# Patient Record
Sex: FEMALE
[De-identification: names, ages, dates, MRNs, and addresses within clinical notes are randomized per-mention and may not be internally consistent; named-entity substitution may affect disease eponyms.]

---

## 2023-06-08 PROBLEM — Z00.00 ENCOUNTER FOR PREVENTIVE HEALTH EXAMINATION: Status: ACTIVE | Noted: 2023-06-08

## 2023-06-13 ENCOUNTER — APPOINTMENT (OUTPATIENT)
Dept: SURGERY | Facility: CLINIC | Age: 38
End: 2023-06-13
Payer: COMMERCIAL

## 2023-06-13 VITALS
HEART RATE: 71 BPM | BODY MASS INDEX: 22.06 KG/M2 | RESPIRATION RATE: 18 BRPM | SYSTOLIC BLOOD PRESSURE: 110 MMHG | OXYGEN SATURATION: 97 % | WEIGHT: 112.38 LBS | DIASTOLIC BLOOD PRESSURE: 72 MMHG | HEIGHT: 60 IN

## 2023-06-13 DIAGNOSIS — Z80.3 FAMILY HISTORY OF MALIGNANT NEOPLASM OF BREAST: ICD-10-CM

## 2023-06-13 DIAGNOSIS — K80.20 CALCULUS OF GALLBLADDER W/OUT CHOLECYSTITIS W/OUT OBSTRUCTION: ICD-10-CM

## 2023-06-13 DIAGNOSIS — Z92.89 PERSONAL HISTORY OF OTHER MEDICAL TREATMENT: ICD-10-CM

## 2023-06-13 DIAGNOSIS — Z82.49 FAMILY HISTORY OF ISCHEMIC HEART DISEASE AND OTHER DISEASES OF THE CIRCULATORY SYSTEM: ICD-10-CM

## 2023-06-13 DIAGNOSIS — Z87.59 PERSONAL HISTORY OF OTHER COMPLICATIONS OF PREGNANCY, CHILDBIRTH AND THE PUERPERIUM: ICD-10-CM

## 2023-06-13 PROCEDURE — 99204 OFFICE O/P NEW MOD 45 MIN: CPT

## 2023-06-13 NOTE — DATA REVIEWED
[FreeTextEntry1] : US abdomen (5/24/2023) - 7 mm gallbladder calculus, and no sonographic evidence of acute cholecystitis.

## 2023-06-13 NOTE — ASSESSMENT
[FreeTextEntry1] : Ms. Arellano is a 37-year-old woman with cholelithiasis and one blood testing result of an AST of 39 (normal up to 32) in 2021, 5 months after giving birth.  As she is minimally symptomatic currently, she would prefer to avoid surgery if possible.  We also discussed possibly performing a cholecystectomy prior to any subsequent pregnancies to avoid gallbladder complications during pregnancy.  She will follow up with me as needed, if she develops ongoing symptoms.  If she wishes to proceed, we will plan for a robotic-assisted cholecystectomy at the patient's convenience.

## 2023-06-13 NOTE — HISTORY OF PRESENT ILLNESS
[de-identified] : Ms. Arellaon presented today for evaluation and management of cholelithiasis that was identified on an ultrasound that was performed due to elevated LFTs in 2021.  Since the ultrasound, she had one episode of pain approximately 3 weeks ago, when she was eating fatty food and she developed dull right upper quadrant pain.  The pain radiated into the back.  She then developed right upper quadrant pain again that same evening at approximately 12 AM, but it was less severe the second time.  She denied associated fever, chills, nausea, vomiting, diarrhea, or constipation.

## 2023-06-13 NOTE — CONSULT LETTER
[FreeTextEntry1] : 2023\par \par \par \par \par On JAN Watson\par [address] [facility]\[place] [address]\par Telephone #: [phone]\par \par \par Re: Amadou Arellano\par : 1985\par \par \par Dear Dr. Watson:\par \par I had the opportunity to see Ms. Arellano today for evaluation and management of cholelithiasis that was identified on an ultrasound that was performed due to elevated LFTs in .  Since the ultrasound, she had one episode of pain approximately 3 weeks ago, when she was eating fatty food and she developed dull right upper quadrant pain.  The pain radiated into the back.  She then developed right upper quadrant pain again that same evening at approximately 12 AM, but it was less severe the second time.  She denied associated fever, chills, nausea, vomiting, diarrhea, or constipation.\par \par On physical examination, her height is 5 feet, her weight is 112 pounds, and her BMI is 21.95.  Her blood pressure is 110/72, her heart rate is 71, and her O2 saturation is 97% on room air.  In general, she is a well-dressed, well-nourished woman who appears her stated age and is in no acute distress.  She is calm, alert and oriented x 3.  HEENT exam demonstrates no scleral icterus and a normocephalic atraumatic appearance. Her abdomen is soft, non-tender, and non-distended.  There is a well-healed Pfannenstiel incision from her prior  section without evidence of any incisional hernias.  Her extremities are warm and dry without clubbing, cyanosis or edema. \par \par I reviewed the report of the ultrasound of the abdomen that was performed on May 24, 2023, which demonstrated a 7 mm gallbladder calculus, and no sonographic evidence of acute cholecystitis.\par \par In summary, Ms. Arellano is a 37-year-old woman with cholelithiasis and one blood testing result of an AST of 39 (normal up to 32) in , 5 months after giving birth.  As she is minimally symptomatic currently, she would prefer to avoid surgery if possible.  We also discussed possibly performing a cholecystectomy prior to any subsequent pregnancies to avoid gallbladder complications during pregnancy.  She will follow up with me as needed, if she develops ongoing symptoms.  If she wishes to proceed, we will plan for a robotic-assisted cholecystectomy at the patient's convenience.\par \par Thank you for the opportunity to care for this patient. Please do not hesitate to contact me in the event that you have any questions or concerns about the care of this patient.\par \par Sincerely,\par \par \par \par Shelly Garcia M.D.\[facility] \[facility]:\par Cordell Her M.D.\[facility]\[address], [address]\[place] [address]\[facility] Telephone #: [phone]